# Patient Record
Sex: MALE | Race: WHITE | NOT HISPANIC OR LATINO | URBAN - METROPOLITAN AREA
[De-identification: names, ages, dates, MRNs, and addresses within clinical notes are randomized per-mention and may not be internally consistent; named-entity substitution may affect disease eponyms.]

---

## 2017-09-29 ENCOUNTER — OFFICE (OUTPATIENT)
Dept: URBAN - METROPOLITAN AREA CLINIC 75 | Facility: CLINIC | Age: 62
End: 2017-09-29

## 2017-09-29 VITALS
HEART RATE: 57 BPM | DIASTOLIC BLOOD PRESSURE: 76 MMHG | SYSTOLIC BLOOD PRESSURE: 128 MMHG | WEIGHT: 196 LBS | HEIGHT: 70 IN

## 2017-09-29 DIAGNOSIS — K64.8 OTHER HEMORRHOIDS: ICD-10-CM

## 2017-09-29 DIAGNOSIS — K57.30 DIVERTICULOSIS OF LARGE INTESTINE WITHOUT PERFORATION OR ABS: ICD-10-CM

## 2017-09-29 DIAGNOSIS — K62.5 HEMORRHAGE OF ANUS AND RECTUM: ICD-10-CM

## 2017-09-29 PROCEDURE — 99204 OFFICE O/P NEW MOD 45 MIN: CPT | Performed by: INTERNAL MEDICINE

## 2017-10-23 VITALS
WEIGHT: 194 LBS | RESPIRATION RATE: 10 BRPM | RESPIRATION RATE: 16 BRPM | HEART RATE: 74 BPM | HEART RATE: 84 BPM | DIASTOLIC BLOOD PRESSURE: 73 MMHG | HEART RATE: 78 BPM | HEART RATE: 70 BPM | OXYGEN SATURATION: 94 % | DIASTOLIC BLOOD PRESSURE: 87 MMHG | SYSTOLIC BLOOD PRESSURE: 110 MMHG | HEART RATE: 63 BPM | SYSTOLIC BLOOD PRESSURE: 134 MMHG | DIASTOLIC BLOOD PRESSURE: 66 MMHG | HEART RATE: 85 BPM | HEART RATE: 67 BPM | HEIGHT: 70 IN | TEMPERATURE: 97.3 F | HEART RATE: 82 BPM | RESPIRATION RATE: 12 BRPM | DIASTOLIC BLOOD PRESSURE: 65 MMHG | SYSTOLIC BLOOD PRESSURE: 119 MMHG | SYSTOLIC BLOOD PRESSURE: 137 MMHG | RESPIRATION RATE: 13 BRPM | TEMPERATURE: 96.6 F | RESPIRATION RATE: 22 BRPM | HEART RATE: 91 BPM | SYSTOLIC BLOOD PRESSURE: 143 MMHG | OXYGEN SATURATION: 92 % | OXYGEN SATURATION: 96 % | SYSTOLIC BLOOD PRESSURE: 149 MMHG | RESPIRATION RATE: 15 BRPM | HEART RATE: 86 BPM | DIASTOLIC BLOOD PRESSURE: 86 MMHG | DIASTOLIC BLOOD PRESSURE: 92 MMHG | DIASTOLIC BLOOD PRESSURE: 90 MMHG | RESPIRATION RATE: 14 BRPM | SYSTOLIC BLOOD PRESSURE: 129 MMHG | DIASTOLIC BLOOD PRESSURE: 74 MMHG | SYSTOLIC BLOOD PRESSURE: 145 MMHG | SYSTOLIC BLOOD PRESSURE: 128 MMHG | OXYGEN SATURATION: 95 % | DIASTOLIC BLOOD PRESSURE: 75 MMHG | OXYGEN SATURATION: 97 %

## 2017-10-24 ENCOUNTER — AMBULATORY SURGICAL CENTER (OUTPATIENT)
Dept: URBAN - METROPOLITAN AREA SURGERY 17 | Facility: SURGERY | Age: 62
End: 2017-10-24

## 2017-10-24 DIAGNOSIS — D17.79 BENIGN LIPOMATOUS NEOPLASM OF OTHER SITES: ICD-10-CM

## 2017-10-24 DIAGNOSIS — K64.8 OTHER HEMORRHOIDS: ICD-10-CM

## 2017-10-24 DIAGNOSIS — K57.30 DIVERTICULOSIS OF LARGE INTESTINE WITHOUT PERFORATION OR ABS: ICD-10-CM

## 2017-10-24 DIAGNOSIS — K62.5 HEMORRHAGE OF ANUS AND RECTUM: ICD-10-CM

## 2017-10-24 PROCEDURE — 45378 DIAGNOSTIC COLONOSCOPY: CPT | Performed by: INTERNAL MEDICINE

## 2017-10-24 RX ADMIN — PROPOFOL 100 MG: 10 INJECTION, EMULSION INTRAVENOUS at 09:06

## 2017-10-24 RX ADMIN — PROPOFOL 25 MG: 10 INJECTION, EMULSION INTRAVENOUS at 09:22

## 2017-10-24 RX ADMIN — PROPOFOL 25 MG: 10 INJECTION, EMULSION INTRAVENOUS at 09:12

## 2017-10-24 RX ADMIN — PROPOFOL 50 MG: 10 INJECTION, EMULSION INTRAVENOUS at 09:08

## 2017-10-24 RX ADMIN — PROPOFOL 50 MG: 10 INJECTION, EMULSION INTRAVENOUS at 09:20

## 2017-10-24 RX ADMIN — PROPOFOL 50 MG: 10 INJECTION, EMULSION INTRAVENOUS at 09:10

## 2017-10-24 RX ADMIN — PROPOFOL 50 MG: 10 INJECTION, EMULSION INTRAVENOUS at 09:16

## 2017-10-24 RX ADMIN — PROPOFOL 25 MG: 10 INJECTION, EMULSION INTRAVENOUS at 09:06

## 2017-10-24 RX ADMIN — PROPOFOL 25 MG: 10 INJECTION, EMULSION INTRAVENOUS at 09:14

## 2017-10-24 RX ADMIN — PROPOFOL 50 MG: 10 INJECTION, EMULSION INTRAVENOUS at 09:18

## 2017-10-24 NOTE — SERVICEHPINOTES
Thank you very much for allowing me to evaluate Mr. Welch. As you know he is a pleasant 62-year-old gentleman who I did a colonoscopy on 4 years ago. He did have hemorrhoids and diverticular disease. He did not have evidence of polyps. His father had colon cancer but he says his father was in his 60s of technically that does not increase his risk based on current guidelines. He is here now however because he's been having intermittent rectal bleeding since March. The bleeding fluctuates but spends severe over the last 2 weeks and he says this been the worst bleeding several had so he was concerned and he saw Dr. Layton yesterday. I talked to Dr. Layton about the situation so I had the patient come in today. He says that he did not have any bleeding this morning. He has no abdominal pain. There is no fevers or chills or weight loss. He does have occasional diarrhea but he says that due to the fact he drinks 6-8 beers a day. He does need to reduce his alcohol consumption. Family history is otherwise negative. Blood work drawn yesterday, I do not have those results. He says in March his labs were "fine". He does use nonsteroidals when necessary.He has no upper GI complaints. There is no reflux, dysphagia, odynophagia nausea or vomiting. He is in no distress. He does not look acutely ill.

## 2017-10-27 ENCOUNTER — OFFICE (OUTPATIENT)
Dept: URBAN - METROPOLITAN AREA CLINIC 75 | Facility: CLINIC | Age: 62
End: 2017-10-27

## 2017-10-27 VITALS — HEIGHT: 70 IN

## 2017-10-27 DIAGNOSIS — K64.4 RESIDUAL HEMORRHOIDAL SKIN TAGS: ICD-10-CM

## 2017-10-27 DIAGNOSIS — K64.1 SECOND DEGREE HEMORRHOIDS: ICD-10-CM

## 2017-10-27 PROCEDURE — 46221 LIGATION OF HEMORRHOID(S): CPT | Performed by: INTERNAL MEDICINE

## 2017-10-27 NOTE — SERVICEHPINOTES
62 year gentleman with intermittent rectal bleeding.  I recently did a colonoscopy on him.  He had evidence of hemorrhoids.  He says at times he has to push them in consistent with grade 2 hemorrhoids.  He presents for hemorrhoid banding.

## 2017-11-13 ENCOUNTER — OFFICE (OUTPATIENT)
Dept: URBAN - METROPOLITAN AREA CLINIC 75 | Facility: CLINIC | Age: 62
End: 2017-11-13

## 2017-11-13 VITALS — HEIGHT: 70 IN

## 2017-11-13 DIAGNOSIS — K64.1 SECOND DEGREE HEMORRHOIDS: ICD-10-CM

## 2017-11-13 DIAGNOSIS — K62.5 HEMORRHAGE OF ANUS AND RECTUM: ICD-10-CM

## 2017-11-13 PROCEDURE — 46221 LIGATION OF HEMORRHOID(S): CPT | Performed by: INTERNAL MEDICINE

## 2017-11-13 NOTE — SERVICEHPINOTES
62 year gentleman with intermittent rectal bleeding. I recently did a colonoscopy on him. He had evidence of hemorrhoids. He says at times he has to push them in consistent with grade 2 hemorrhoids. He presents for his 2nd hemorrhoid banding. He is also c/o diarrhea. This is been a problem for some time.  He has not mentioned this before. He also had some rectal spasm and nausea after his first banding.

## 2017-11-15 ENCOUNTER — OFFICE (OUTPATIENT)
Dept: URBAN - METROPOLITAN AREA LAB 2 | Facility: LAB | Age: 62
End: 2017-11-15
Payer: COMMERCIAL

## 2017-11-15 DIAGNOSIS — R19.7 DIARRHEA, UNSPECIFIED: ICD-10-CM

## 2017-11-15 PROCEDURE — 87507 IADNA-DNA/RNA PROBE TQ 12-25: CPT | Performed by: INTERNAL MEDICINE

## 2017-12-08 ENCOUNTER — OFFICE (OUTPATIENT)
Dept: URBAN - METROPOLITAN AREA CLINIC 75 | Facility: CLINIC | Age: 62
End: 2017-12-08

## 2017-12-08 VITALS — HEIGHT: 70 IN

## 2017-12-08 DIAGNOSIS — K64.1 SECOND DEGREE HEMORRHOIDS: ICD-10-CM

## 2017-12-08 PROCEDURE — 46221 LIGATION OF HEMORRHOID(S): CPT | Performed by: INTERNAL MEDICINE

## 2017-12-08 NOTE — SERVICEHPINOTES
62 year gentleman with intermittent rectal bleeding. I recently did a colonoscopy on him. He had evidence of hemorrhoids. He says at times he has to push them in consistent with grade 2 hemorrhoids. He presents for his 3rd hemorrhoid banding. He is also c/o diarrhea. W/U negative, diarrhea improved per pt.

## 2021-11-30 ENCOUNTER — IMMUNIZATION (OUTPATIENT)
Dept: VACCINE CLINIC | Facility: HOSPITAL | Age: 66
End: 2021-11-30

## 2021-11-30 PROCEDURE — 0004A HC ADM SARSCOV2 30MCG/0.3ML BOOSTER: CPT | Performed by: INTERNAL MEDICINE

## 2021-11-30 PROCEDURE — 91300 HC SARSCOV02 VAC 30MCG/0.3ML IM: CPT | Performed by: INTERNAL MEDICINE

## 2022-06-17 ENCOUNTER — OFFICE (OUTPATIENT)
Dept: URBAN - METROPOLITAN AREA CLINIC 75 | Facility: CLINIC | Age: 67
End: 2022-06-17

## 2022-06-17 VITALS
SYSTOLIC BLOOD PRESSURE: 130 MMHG | DIASTOLIC BLOOD PRESSURE: 80 MMHG | HEART RATE: 64 BPM | HEIGHT: 70 IN | OXYGEN SATURATION: 93 % | WEIGHT: 218 LBS

## 2022-06-17 DIAGNOSIS — K62.5 HEMORRHAGE OF ANUS AND RECTUM: ICD-10-CM

## 2022-06-17 DIAGNOSIS — K64.8 OTHER HEMORRHOIDS: ICD-10-CM

## 2022-06-17 DIAGNOSIS — K57.30 DIVERTICULOSIS OF LARGE INTESTINE WITHOUT PERFORATION OR ABS: ICD-10-CM

## 2022-06-17 DIAGNOSIS — Z80.0 FAMILY HISTORY OF MALIGNANT NEOPLASM OF DIGESTIVE ORGANS: ICD-10-CM

## 2022-06-17 PROCEDURE — 99204 OFFICE O/P NEW MOD 45 MIN: CPT | Performed by: INTERNAL MEDICINE

## 2022-06-17 NOTE — SERVICEHPINOTES
thank you very much for referring Mister Welch for evaluation.  As you know he is a pleasant 67-year-old gentleman who does have a history of hemorrhoids.  He also has a family history of colon cancer, his dad had colon cancer in his early 60s so technically that's not a risk factor based on guidelines.  However the patient is here today because he's having worsening rectal bleeding, he says it's been "filling the bowl".  It is sporadic.  It's been worse the last several months.  He does take Aleve daily.  He is not constipated, he doesn't strain.  There is no abdominal pain or weight loss.
br
br He has no upper GI complaints.  There is no reflux, dysphagia, odynophagia nausea or vomiting.  There is no melena or hematemesis.  He says he does drink beer daily.  He is in no acute distress.

## 2022-06-28 VITALS
HEART RATE: 56 BPM | RESPIRATION RATE: 15 BRPM | SYSTOLIC BLOOD PRESSURE: 127 MMHG | OXYGEN SATURATION: 100 % | SYSTOLIC BLOOD PRESSURE: 150 MMHG | RESPIRATION RATE: 20 BRPM | DIASTOLIC BLOOD PRESSURE: 67 MMHG | DIASTOLIC BLOOD PRESSURE: 78 MMHG | DIASTOLIC BLOOD PRESSURE: 76 MMHG | SYSTOLIC BLOOD PRESSURE: 145 MMHG | SYSTOLIC BLOOD PRESSURE: 123 MMHG | HEART RATE: 61 BPM | HEART RATE: 60 BPM | SYSTOLIC BLOOD PRESSURE: 139 MMHG | HEART RATE: 58 BPM | HEART RATE: 73 BPM | OXYGEN SATURATION: 96 % | SYSTOLIC BLOOD PRESSURE: 119 MMHG | OXYGEN SATURATION: 97 % | DIASTOLIC BLOOD PRESSURE: 82 MMHG | DIASTOLIC BLOOD PRESSURE: 64 MMHG | WEIGHT: 213 LBS | DIASTOLIC BLOOD PRESSURE: 77 MMHG | OXYGEN SATURATION: 93 % | TEMPERATURE: 97.2 F | HEART RATE: 63 BPM | RESPIRATION RATE: 18 BRPM | SYSTOLIC BLOOD PRESSURE: 136 MMHG | SYSTOLIC BLOOD PRESSURE: 131 MMHG | HEIGHT: 70 IN | DIASTOLIC BLOOD PRESSURE: 87 MMHG | RESPIRATION RATE: 11 BRPM | DIASTOLIC BLOOD PRESSURE: 74 MMHG | HEART RATE: 68 BPM | HEART RATE: 65 BPM | OXYGEN SATURATION: 94 % | OXYGEN SATURATION: 95 % | RESPIRATION RATE: 16 BRPM | RESPIRATION RATE: 7 BRPM | HEART RATE: 66 BPM

## 2022-06-29 ENCOUNTER — AMBULATORY SURGICAL CENTER (OUTPATIENT)
Dept: URBAN - METROPOLITAN AREA SURGERY 17 | Facility: SURGERY | Age: 67
End: 2022-06-29

## 2022-06-29 ENCOUNTER — OFFICE (OUTPATIENT)
Dept: URBAN - METROPOLITAN AREA PATHOLOGY 4 | Facility: PATHOLOGY | Age: 67
End: 2022-06-29

## 2022-06-29 DIAGNOSIS — K92.1 MELENA: ICD-10-CM

## 2022-06-29 DIAGNOSIS — K62.5 HEMORRHAGE OF ANUS AND RECTUM: ICD-10-CM

## 2022-06-29 DIAGNOSIS — D12.4 BENIGN NEOPLASM OF DESCENDING COLON: ICD-10-CM

## 2022-06-29 DIAGNOSIS — D17.79 BENIGN LIPOMATOUS NEOPLASM OF OTHER SITES: ICD-10-CM

## 2022-06-29 DIAGNOSIS — K57.30 DIVERTICULOSIS OF LARGE INTESTINE WITHOUT PERFORATION OR ABS: ICD-10-CM

## 2022-06-29 LAB
GI HISTOLOGY: A. UNSPECIFIED: (no result)
GI HISTOLOGY: PDF REPORT: (no result)

## 2022-06-29 PROCEDURE — 88305 TISSUE EXAM BY PATHOLOGIST: CPT | Performed by: INTERNAL MEDICINE

## 2022-06-29 PROCEDURE — 45385 COLONOSCOPY W/LESION REMOVAL: CPT | Performed by: INTERNAL MEDICINE

## 2022-06-29 NOTE — SERVICEHPINOTES
thank you very much for referring Mister Welch for evaluation.  As you know he is a pleasant 67-year-old gentleman who does have a history of hemorrhoids.  He also has a family history of colon cancer, his dad had colon cancer in his early 60s so technically that's not a risk factor based on guidelines.  However the patient is here today because he's having worsening rectal bleeding, he says it's been "filling the bowl".  It is sporadic.  It's been worse the last several months.  He does take Aleve daily.  He is not constipated, he doesn't strain.  There is no abdominal pain or weight loss.He has no upper GI complaints.  There is no reflux, dysphagia, odynophagia nausea or vomiting.  There is no melena or hematemesis.  He says he does drink beer daily.  He is in no acute distress.

## 2022-09-22 ENCOUNTER — OFFICE (OUTPATIENT)
Dept: URBAN - METROPOLITAN AREA CLINIC 75 | Facility: CLINIC | Age: 67
End: 2022-09-22

## 2022-09-22 VITALS — HEIGHT: 70 IN

## 2022-09-22 DIAGNOSIS — K64.8 OTHER HEMORRHOIDS: ICD-10-CM

## 2022-09-22 PROCEDURE — 46221 LIGATION OF HEMORRHOID(S): CPT | Performed by: INTERNAL MEDICINE

## 2022-09-22 RX ORDER — HYDROCORTISONE 25 MG/G
OINTMENT TOPICAL
Qty: 30 | Refills: 6 | Status: COMPLETED
End: 2023-02-24

## 2022-09-22 NOTE — SERVICEHPINOTES
67-year-old gentleman with rectal bleeding.  He has a history of hemorrhoids.  A bandage in the past.  He status post recent colonoscopy and had grade 1 hemorrhoids.  He presents for hemorrhoid banding.  He also says since the weekend he is had itching and pain and rash in the perianal area and left buttock area.  It does look like a possible viral rash.

## 2022-10-27 ENCOUNTER — OFFICE (OUTPATIENT)
Dept: URBAN - METROPOLITAN AREA CLINIC 76 | Facility: CLINIC | Age: 67
End: 2022-10-27

## 2022-10-27 VITALS — HEIGHT: 70 IN

## 2022-10-27 DIAGNOSIS — K64.0 FIRST DEGREE HEMORRHOIDS: ICD-10-CM

## 2022-10-27 PROCEDURE — 46221 LIGATION OF HEMORRHOID(S): CPT | Performed by: INTERNAL MEDICINE

## 2022-10-27 NOTE — SERVICEHPINOTES
67-year-old gentleman with rectal bleeding.  He has a history of hemorrhoids.  He status post recent colonoscopy and had grade 1 hemorrhoids.  He presents for hemorrhoid banding.  He also says since the weekend he is had itching and pain and rash in the perianal area and left buttock area.  It does look like a possible viral rash. his rash has improved.  He is here for his second banding.

## 2022-11-11 ENCOUNTER — OFFICE (OUTPATIENT)
Dept: URBAN - METROPOLITAN AREA CLINIC 76 | Facility: CLINIC | Age: 67
End: 2022-11-11

## 2022-11-11 VITALS — HEIGHT: 70 IN

## 2022-11-11 DIAGNOSIS — K64.0 FIRST DEGREE HEMORRHOIDS: ICD-10-CM

## 2022-11-11 PROCEDURE — 46221 LIGATION OF HEMORRHOID(S): CPT | Performed by: INTERNAL MEDICINE

## 2022-11-11 NOTE — SERVICEHPINOTES
67-year-old gentleman with rectal bleeding.  He has a history of hemorrhoids.  He status post recent colonoscopy and had grade 1 hemorrhoids.  He presents for hemorrhoid banding.  He also says since the weekend he is had itching and pain and rash in the perianal area and left buttock area.  It does look like a possible viral rash. his rash has improved.  He is here for his second third banding.

## 2023-02-24 ENCOUNTER — OFFICE (OUTPATIENT)
Dept: URBAN - METROPOLITAN AREA CLINIC 76 | Facility: CLINIC | Age: 68
End: 2023-02-24

## 2023-02-24 VITALS
SYSTOLIC BLOOD PRESSURE: 140 MMHG | DIASTOLIC BLOOD PRESSURE: 90 MMHG | WEIGHT: 224.6 LBS | HEIGHT: 70 IN | HEART RATE: 89 BPM | OXYGEN SATURATION: 90 %

## 2023-02-24 DIAGNOSIS — K62.5 HEMORRHAGE OF ANUS AND RECTUM: ICD-10-CM

## 2023-02-24 DIAGNOSIS — K57.30 DIVERTICULOSIS OF LARGE INTESTINE WITHOUT PERFORATION OR ABS: ICD-10-CM

## 2023-02-24 DIAGNOSIS — R19.7 DIARRHEA, UNSPECIFIED: ICD-10-CM

## 2023-02-24 DIAGNOSIS — R14.0 ABDOMINAL DISTENSION (GASEOUS): ICD-10-CM

## 2023-02-24 DIAGNOSIS — K64.8 OTHER HEMORRHOIDS: ICD-10-CM

## 2023-02-24 PROBLEM — D17.79 BENIGN LIPOMATOUS NEOPLASM OF OTHER SITES: Status: ACTIVE | Noted: 2017-10-24

## 2023-02-24 PROBLEM — K92.1 HEMATOCHEZIA: Status: ACTIVE | Noted: 2022-06-29

## 2023-02-24 PROBLEM — K63.5 POLYP OF COLON: Status: ACTIVE | Noted: 2022-06-29

## 2023-02-24 PROCEDURE — 99214 OFFICE O/P EST MOD 30 MIN: CPT | Performed by: INTERNAL MEDICINE

## 2023-02-24 NOTE — SERVICEHPINOTES
Mister Welch is here today because he has been having worsening GI issues.  He's had some weight loss acutely.  He was gaining weight.  He tells me that he gets full easily after meals he'll have bloating and satiety and he was golfing over the weekend and he says he had been drinking too much.  On Saturday and Sunday he started having rectal bleeding and past nothing but blood.  He still seeing some blood but it is less, he is now seeing blood in stool mixed.  Lab work done earlier this week was unremarkable.  He does report some fatigue but again he is not anemic.  He does not take a blood thinner.  He does have hemorrhoids not done banding on him in the past.  He is in no acute distress but he is concerned about the amount of bleeding.  I did do a colonoscopy on him last year but he is again worried so it's probably reasonable to repeat a scope given the amount of blood.  He may simply had a hemorrhoidal bleed or diverticular bleed.
nakul mota Also as above he's having bloating fullness and satiety so he needs upper endoscopy as well to evaluate for gastritis ulcers or erosions.
br
nakul He has a history of hepatitis C.  He was treated by Dr. Mora years ago.  I don't know if he ever had any fibrosis but again he has a history of heavy alcohol use so I'm going to set him up for a fibro-scan.  He is in no acute distress today.